# Patient Record
(demographics unavailable — no encounter records)

---

## 2024-10-21 NOTE — PROCEDURE
[FreeTextEntry3] : Procedure note:  Right mastoid debridement.  Description of Procedure:  Mastoid debridement was performed under the operating microscope using otologic instrumentation.  The patient tolerated the procedure without complications. Procedure note:  Left mastoid debridement.  Description of Procedure:  Mastoid debridement was performed under the operating microscope using otologic instrumentation.  The patient tolerated the procedure without complications.

## 2024-10-21 NOTE — ASSESSMENT
[FreeTextEntry1] : Reports intermittent drainage from both ears since last visit.  Exam today shows mostly dry bilateral canal wall down cavities.  Dry cerumen removed from superior aspect of right mastoid bowl.  Scant discharge overlying tympanic membrane bilaterally which was suctioned.  I reviewed a new audiogram which shows a bilateral conductive hearing loss.  Topical powder reapplied both ears, TMJ precautions.  New medical clearance for hearing aids provided.

## 2024-10-21 NOTE — REASON FOR VISIT
[Subsequent Evaluation] : a subsequent evaluation for [Hearing Loss] : hearing loss [FreeTextEntry2] : Cholesteatoma